# Patient Record
Sex: FEMALE | URBAN - METROPOLITAN AREA
[De-identification: names, ages, dates, MRNs, and addresses within clinical notes are randomized per-mention and may not be internally consistent; named-entity substitution may affect disease eponyms.]

---

## 2022-07-21 ENCOUNTER — OFFICE VISIT (OUTPATIENT)
Dept: ORTHOPEDIC SURGERY | Age: 32
End: 2022-07-21
Payer: COMMERCIAL

## 2022-07-21 VITALS
HEIGHT: 62 IN | WEIGHT: 209 LBS | SYSTOLIC BLOOD PRESSURE: 119 MMHG | OXYGEN SATURATION: 97 % | BODY MASS INDEX: 38.46 KG/M2 | DIASTOLIC BLOOD PRESSURE: 69 MMHG | HEART RATE: 90 BPM | TEMPERATURE: 97 F

## 2022-07-21 DIAGNOSIS — S62.661A NONDISPLACED FRACTURE OF DISTAL PHALANX OF LEFT INDEX FINGER, INITIAL ENCOUNTER FOR CLOSED FRACTURE: Primary | ICD-10-CM

## 2022-07-21 PROCEDURE — 73130 X-RAY EXAM OF HAND: CPT | Performed by: ORTHOPAEDIC SURGERY

## 2022-07-21 PROCEDURE — 26750 TREAT FINGER FRACTURE EACH: CPT | Performed by: ORTHOPAEDIC SURGERY

## 2022-07-21 PROCEDURE — 99203 OFFICE O/P NEW LOW 30 MIN: CPT | Performed by: ORTHOPAEDIC SURGERY

## 2022-07-21 NOTE — PROGRESS NOTES
Aliya Rush is a 33 y.o. female right handed employed. Worker's Compensation and legal considerations: none filed. Vitals:    07/21/22 1603   BP: 119/69   Pulse: 90   Temp: 97 °F (36.1 °C)   SpO2: 97%   Weight: 209 lb (94.8 kg)   Height: 5' 1.5\" (1.562 m)   PainSc:   0 - No pain           Chief Complaint   Patient presents with    Finger Pain     LEFT INDEX FINGER         HPI: Patient presents today due to an injury to her left index finger    Date of onset: 7/18/2022    Injury: Yes: Comment: axial load    Prior Treatment:  Yes: Comment: splint    Numbness/ Tingling: No      ROS: Review of Systems - General ROS: negative  Psychological ROS: negative  ENT ROS: negative  Allergy and Immunology ROS: negative  Hematological and Lymphatic ROS: negative  Respiratory ROS: no cough, shortness of breath, or wheezing  Cardiovascular ROS: no chest pain or dyspnea on exertion  Gastrointestinal ROS: no abdominal pain, change in bowel habits, or black or bloody stools  Musculoskeletal ROS: negative  Neurological ROS: negative  Dermatological ROS: negative    History reviewed. No pertinent past medical history. Past Surgical History:   Procedure Laterality Date    HX HERNIA REPAIR             Allergies   Allergen Reactions    Peanuts [Peanut] Hives           PE:     Physical Exam  Vitals and nursing note reviewed. Constitutional:       General: She is not in acute distress. Appearance: Normal appearance. She is not ill-appearing. Cardiovascular:      Pulses: Normal pulses. Pulmonary:      Effort: Pulmonary effort is normal. No respiratory distress. Musculoskeletal:         General: Tenderness present. No swelling, deformity or signs of injury. Normal range of motion. Cervical back: Normal range of motion and neck supple. Right lower leg: No edema. Left lower leg: No edema. Skin:     General: Skin is warm and dry. Capillary Refill: Capillary refill takes less than 2 seconds. Neurological:      General: No focal deficit present. Mental Status: She is alert and oriented to person, place, and time. Psychiatric:         Mood and Affect: Mood normal.         Behavior: Behavior normal.          Left index finger: There is minimal edema. There is tenderness to palpation and stiffness about the index finger. When coached patient is able to fully flex digit into a fist.  Neurovascularly intact. Imagin2022 3 views of left hand shows a nondisplaced fracture of the proximal radial distal phalanx. This is intra-articular but there is no interval displacement compared to injury films. ICD-10-CM ICD-9-CM    1. Nondisplaced fracture of distal phalanx of left index finger, initial encounter for closed fracture  S62.661A 816.02 AMB POC XRAY, HAND; 3+ VIEWS      CLOSE TX DIST FINGR FX      KS APPLY FINGER SPLINT,STATIC            Plan:     Left index finger splint applied today. Continue to wear the splint for the next 4 weeks. Instructed patient to take the splint off once a day for gentle range of motion. Able to return to work as long as wearing splint for the next 4 weeks at work. Follow-up and Dispositions    Return in about 4 weeks (around 2022) for Reevaluation and x-rays of left index finger.           Plan was reviewed with patient, who verbalized agreement and understanding of the plan

## 2022-07-21 NOTE — LETTER
NOTIFICATION RETURN TO WORK     7/21/2022 4:23 PM    Ms. Mae [id number]      To Whom It May Concern:    Nito Chilel is currently under the care of [address] [place] [address] Bharat George. She will return to work 7/25/2022. Please allow patient to wear splint at all times for the next four weeks. If there are questions or concerns please have the patient contact our office.         Sincerely,      Juan Antonio Estrada, DO

## 2022-08-18 ENCOUNTER — OFFICE VISIT (OUTPATIENT)
Dept: ORTHOPEDIC SURGERY | Age: 32
End: 2022-08-18
Payer: COMMERCIAL

## 2022-08-18 VITALS — BODY MASS INDEX: 38.71 KG/M2 | WEIGHT: 205 LBS | HEIGHT: 61 IN | TEMPERATURE: 98 F

## 2022-08-18 DIAGNOSIS — S62.661D CLOSED NONDISPLACED FRACTURE OF DISTAL PHALANX OF LEFT INDEX FINGER WITH ROUTINE HEALING, SUBSEQUENT ENCOUNTER: Primary | ICD-10-CM

## 2022-08-18 PROCEDURE — 73140 X-RAY EXAM OF FINGER(S): CPT | Performed by: ORTHOPAEDIC SURGERY

## 2022-08-18 PROCEDURE — 99024 POSTOP FOLLOW-UP VISIT: CPT | Performed by: ORTHOPAEDIC SURGERY

## 2022-08-18 NOTE — PROGRESS NOTES
Raquel Nash is a 33 y.o. female right handed employed. Worker's Compensation and legal considerations: none filed. Vitals:    08/18/22 1409   Temp: 98 °F (36.7 °C)   TempSrc: Temporal   Weight: 205 lb (93 kg)   Height: 5' 1\" (1.549 m)   PainSc:   2   PainLoc: Finger           Chief Complaint   Patient presents with    Finger Pain     Left index finger pain       HPI: Patient presents today for follow-up of left index finger distal phalanx fracture. She reports the pain to be much better and to be doing most things. Initial HPI: Patient presents today due to an injury to her left index finger    Date of onset: 7/18/2022    Injury: Yes: Comment: axial load    Prior Treatment:  Yes: Comment: splint    Numbness/ Tingling: No      ROS: Review of Systems - General ROS: negative  Psychological ROS: negative  ENT ROS: negative  Allergy and Immunology ROS: negative  Hematological and Lymphatic ROS: negative  Respiratory ROS: no cough, shortness of breath, or wheezing  Cardiovascular ROS: no chest pain or dyspnea on exertion  Gastrointestinal ROS: no abdominal pain, change in bowel habits, or black or bloody stools  Musculoskeletal ROS: negative  Neurological ROS: negative  Dermatological ROS: negative    History reviewed. No pertinent past medical history. Past Surgical History:   Procedure Laterality Date    HX HERNIA REPAIR             Allergies   Allergen Reactions    Peanuts [Peanut] Hives           PE:     Physical Exam  Vitals and nursing note reviewed. Constitutional:       General: She is not in acute distress. Appearance: Normal appearance. She is not ill-appearing. Cardiovascular:      Pulses: Normal pulses. Pulmonary:      Effort: Pulmonary effort is normal. No respiratory distress. Musculoskeletal:         General: No swelling, tenderness, deformity or signs of injury. Normal range of motion. Cervical back: Normal range of motion and neck supple. Right lower leg: No edema. Left lower leg: No edema. Skin:     General: Skin is warm and dry. Capillary Refill: Capillary refill takes less than 2 seconds. Neurological:      General: No focal deficit present. Mental Status: She is alert and oriented to person, place, and time. Psychiatric:         Mood and Affect: Mood normal.         Behavior: Behavior normal.          Left index finger: No edema. Range of motion full. Neurovascularly intact distally. Imagin2022 3 views of left index finger is positive for significant interval callus formation at the fracture site with no interval displacement noted. 2022 3 views of left hand shows a nondisplaced fracture of the proximal radial distal phalanx. This is intra-articular but there is no interval displacement compared to injury films. ICD-10-CM ICD-9-CM    1. Closed nondisplaced fracture of distal phalanx of left index finger with routine healing, subsequent encounter  S62.661D V54.19 AMB POC XRAY, FINGER(S), 2+ VIEWS            Plan:     Resume activities as tolerated. Discontinue splint. Follow-up and Dispositions    Return if symptoms worsen or fail to improve.           Plan was reviewed with patient, who verbalized agreement and understanding of the plan

## 2023-01-12 ENCOUNTER — HOSPITAL ENCOUNTER (OUTPATIENT)
Dept: OCCUPATIONAL MEDICINE | Age: 33
Discharge: HOME | End: 2023-01-12
Attending: FAMILY MEDICINE

## 2023-01-12 DIAGNOSIS — Z86.11 HISTORY OF TB (TUBERCULOSIS): ICD-10-CM
